# Patient Record
Sex: FEMALE | Race: WHITE | Employment: FULL TIME | URBAN - METROPOLITAN AREA
[De-identification: names, ages, dates, MRNs, and addresses within clinical notes are randomized per-mention and may not be internally consistent; named-entity substitution may affect disease eponyms.]

---

## 2018-03-12 PROCEDURE — 99285 EMERGENCY DEPT VISIT HI MDM: CPT

## 2018-03-12 PROCEDURE — 99285 EMERGENCY DEPT VISIT HI MDM: CPT | Mod: 25

## 2018-03-12 PROCEDURE — 93005 ELECTROCARDIOGRAM TRACING: CPT

## 2018-03-13 ENCOUNTER — APPOINTMENT (OUTPATIENT)
Dept: ULTRASOUND IMAGING | Facility: CLINIC | Age: 28
End: 2018-03-13
Attending: EMERGENCY MEDICINE
Payer: COMMERCIAL

## 2018-03-13 ENCOUNTER — HOSPITAL ENCOUNTER (EMERGENCY)
Facility: CLINIC | Age: 28
Discharge: HOME | End: 2018-03-13
Attending: EMERGENCY MEDICINE | Admitting: EMERGENCY MEDICINE
Payer: COMMERCIAL

## 2018-03-13 ENCOUNTER — APPOINTMENT (OUTPATIENT)
Dept: CT IMAGING | Facility: CLINIC | Age: 28
End: 2018-03-13
Attending: EMERGENCY MEDICINE
Payer: COMMERCIAL

## 2018-03-13 VITALS
RESPIRATION RATE: 18 BRPM | SYSTOLIC BLOOD PRESSURE: 107 MMHG | HEART RATE: 62 BPM | DIASTOLIC BLOOD PRESSURE: 73 MMHG | WEIGHT: 148.15 LBS | TEMPERATURE: 98.3 F | OXYGEN SATURATION: 100 %

## 2018-03-13 DIAGNOSIS — R10.13 ABDOMINAL PAIN, EPIGASTRIC: ICD-10-CM

## 2018-03-13 DIAGNOSIS — R07.9 ACUTE CHEST PAIN: ICD-10-CM

## 2018-03-13 LAB
ALBUMIN SERPL-MCNC: 2.9 G/DL (ref 3.4–5)
ALP SERPL-CCNC: 37 U/L (ref 40–150)
ALT SERPL W P-5'-P-CCNC: 19 U/L (ref 0–50)
ANION GAP SERPL CALCULATED.3IONS-SCNC: 3 MMOL/L (ref 3–14)
AST SERPL W P-5'-P-CCNC: 16 U/L (ref 0–45)
BASOPHILS # BLD AUTO: 0.1 10E9/L (ref 0–0.2)
BASOPHILS NFR BLD AUTO: 0.6 %
BILIRUB SERPL-MCNC: 0.3 MG/DL (ref 0.2–1.3)
BUN SERPL-MCNC: 13 MG/DL (ref 7–30)
CALCIUM SERPL-MCNC: 8.5 MG/DL (ref 8.5–10.1)
CHLORIDE SERPL-SCNC: 103 MMOL/L (ref 94–109)
CO2 SERPL-SCNC: 31 MMOL/L (ref 20–32)
CREAT SERPL-MCNC: 0.69 MG/DL (ref 0.52–1.04)
D DIMER PPP FEU-MCNC: 0.7 UG/ML FEU (ref 0–0.5)
DIFFERENTIAL METHOD BLD: NORMAL
EOSINOPHIL # BLD AUTO: 0.3 10E9/L (ref 0–0.7)
EOSINOPHIL NFR BLD AUTO: 3.6 %
ERYTHROCYTE [DISTWIDTH] IN BLOOD BY AUTOMATED COUNT: 14.1 % (ref 10–15)
GFR SERPL CREATININE-BSD FRML MDRD: >90 ML/MIN/1.7M2
GLUCOSE SERPL-MCNC: 106 MG/DL (ref 70–99)
HCT VFR BLD AUTO: 36.5 % (ref 35–47)
HGB BLD-MCNC: 12.2 G/DL (ref 11.7–15.7)
IMM GRANULOCYTES # BLD: 0 10E9/L (ref 0–0.4)
IMM GRANULOCYTES NFR BLD: 0.2 %
INTERPRETATION ECG - MUSE: NORMAL
LIPASE SERPL-CCNC: 256 U/L (ref 73–393)
LYMPHOCYTES # BLD AUTO: 2.8 10E9/L (ref 0.8–5.3)
LYMPHOCYTES NFR BLD AUTO: 33.7 %
MCH RBC QN AUTO: 28.9 PG (ref 26.5–33)
MCHC RBC AUTO-ENTMCNC: 33.4 G/DL (ref 31.5–36.5)
MCV RBC AUTO: 87 FL (ref 78–100)
MONOCYTES # BLD AUTO: 0.7 10E9/L (ref 0–1.3)
MONOCYTES NFR BLD AUTO: 7.7 %
NEUTROPHILS # BLD AUTO: 4.6 10E9/L (ref 1.6–8.3)
NEUTROPHILS NFR BLD AUTO: 54.2 %
NRBC # BLD AUTO: 0 10*3/UL
NRBC BLD AUTO-RTO: 0 /100
PLATELET # BLD AUTO: 274 10E9/L (ref 150–450)
POTASSIUM SERPL-SCNC: 4.1 MMOL/L (ref 3.4–5.3)
PROT SERPL-MCNC: 6.4 G/DL (ref 6.8–8.8)
RBC # BLD AUTO: 4.22 10E12/L (ref 3.8–5.2)
SODIUM SERPL-SCNC: 137 MMOL/L (ref 133–144)
WBC # BLD AUTO: 8.4 10E9/L (ref 4–11)

## 2018-03-13 PROCEDURE — 25000132 ZZH RX MED GY IP 250 OP 250 PS 637: Performed by: EMERGENCY MEDICINE

## 2018-03-13 PROCEDURE — 85379 FIBRIN DEGRADATION QUANT: CPT | Performed by: EMERGENCY MEDICINE

## 2018-03-13 PROCEDURE — 80053 COMPREHEN METABOLIC PANEL: CPT | Performed by: EMERGENCY MEDICINE

## 2018-03-13 PROCEDURE — 83690 ASSAY OF LIPASE: CPT | Performed by: EMERGENCY MEDICINE

## 2018-03-13 PROCEDURE — 25000125 ZZHC RX 250: Performed by: EMERGENCY MEDICINE

## 2018-03-13 PROCEDURE — 25000128 H RX IP 250 OP 636: Performed by: EMERGENCY MEDICINE

## 2018-03-13 PROCEDURE — 36415 COLL VENOUS BLD VENIPUNCTURE: CPT | Performed by: EMERGENCY MEDICINE

## 2018-03-13 PROCEDURE — 96375 TX/PRO/DX INJ NEW DRUG ADDON: CPT | Mod: 59

## 2018-03-13 PROCEDURE — 76705 ECHO EXAM OF ABDOMEN: CPT

## 2018-03-13 PROCEDURE — 96374 THER/PROPH/DIAG INJ IV PUSH: CPT

## 2018-03-13 PROCEDURE — 71260 CT THORAX DX C+: CPT

## 2018-03-13 PROCEDURE — 85025 COMPLETE CBC W/AUTO DIFF WBC: CPT | Performed by: EMERGENCY MEDICINE

## 2018-03-13 RX ORDER — KETOROLAC TROMETHAMINE 15 MG/ML
15 INJECTION, SOLUTION INTRAMUSCULAR; INTRAVENOUS ONCE
Status: COMPLETED | OUTPATIENT
Start: 2018-03-13 | End: 2018-03-13

## 2018-03-13 RX ORDER — IOPAMIDOL 755 MG/ML
500 INJECTION, SOLUTION INTRAVASCULAR ONCE
Status: COMPLETED | OUTPATIENT
Start: 2018-03-13 | End: 2018-03-13

## 2018-03-13 RX ORDER — MORPHINE SULFATE 4 MG/ML
4 INJECTION, SOLUTION INTRAMUSCULAR; INTRAVENOUS
Status: DISCONTINUED | OUTPATIENT
Start: 2018-03-13 | End: 2018-03-13 | Stop reason: HOSPADM

## 2018-03-13 RX ORDER — MORPHINE SULFATE 4 MG/ML
4 INJECTION, SOLUTION INTRAMUSCULAR; INTRAVENOUS ONCE
Status: DISCONTINUED | OUTPATIENT
Start: 2018-03-13 | End: 2018-03-13

## 2018-03-13 RX ADMIN — KETOROLAC TROMETHAMINE 15 MG: 15 INJECTION, SOLUTION INTRAMUSCULAR; INTRAVENOUS at 02:39

## 2018-03-13 RX ADMIN — LIDOCAINE HYDROCHLORIDE 30 ML: 20 SOLUTION ORAL; TOPICAL at 00:43

## 2018-03-13 RX ADMIN — IOPAMIDOL 59 ML: 755 INJECTION, SOLUTION INTRAVENOUS at 02:17

## 2018-03-13 RX ADMIN — MORPHINE SULFATE 4 MG: 4 INJECTION INTRAVENOUS at 01:44

## 2018-03-13 RX ADMIN — SODIUM CHLORIDE 79 ML: 9 INJECTION, SOLUTION INTRAVENOUS at 02:17

## 2018-03-13 ASSESSMENT — ENCOUNTER SYMPTOMS
DIAPHORESIS: 0
CHILLS: 0
VOMITING: 0
ABDOMINAL PAIN: 0
FEVER: 0
BACK PAIN: 1
NAUSEA: 0
SHORTNESS OF BREATH: 1
HEADACHES: 1

## 2018-03-13 NOTE — ED AVS SNAPSHOT
[facility] Emergency Department    [address]    [facility] [address]    Phone:  [phone]    Fax:  [phone]                                       Wendi Cerda   MRN: [id number]    Department:  [facility] Emergency Department   Date of Visit:  3/12/2018           Patient Information     Date Of Birth          1990        Your diagnoses for this visit were:     Acute chest pain     Abdominal pain, epigastric        You were seen by Sloane Vinson MD.      Follow-up Information     Follow up with Clinic, [facility] In 2 days.    Why:  for recheck     Contact information:    [id number] [facility] [id number] [phone]          Follow up with [facility] Emergency Department.    Specialty:  EMERGENCY MEDICINE    Why:  If symptoms worsen    Contact information:    [address]  [facility] [address]  [phone]        Discharge Instructions       Discharge Instructions  Chest Pain    You have been seen today for chest pain or discomfort.  At this time, your provider has found no signs that your chest pain is due to a serious or life-threatening condition, (or you have declined more testing and/or admission to the hospital). However, sometimes there is a serious problem that does not show up right away. Your evaluation today may not be complete and you may need further testing and evaluation.     Generally, every Emergency Department visit should have a follow-up clinic visit with either a primary or a specialty clinic/provider. Please follow-up as instructed by your emergency provider today.  Return to the Emergency Department if:    Your chest pain changes, gets worse, starts to happen more often, or comes with less activity.    You are newly short of breath.    You get very weak or tired.    You pass out or faint.    You have any new symptoms, like fever, cough, numb legs, or you cough up  blood.    You have anything else that worries you.    Until you follow-up with your regular provider, please do the following:    Take one aspirin daily unless you have an allergy or are told not to by your provider.    If a stress test appointment has been made, go to the appointment.    If you have questions, contact your regular provider.    Follow-up with your regular provider/clinic as directed; this is very important.    If you were given a prescription for medicine here today, be sure to read all of the information (including the package insert) that comes with your prescription.  This will include important information about the medicine, its side effects, and any warnings that you need to know about.  The pharmacist who fills the prescription can provide more information and answer questions you may have about the medicine.  If you have questions or concerns that the pharmacist cannot address, please call or return to the Emergency Department.       Remember that you can always come back to the Emergency Department if you are not able to see your regular provider in the amount of time listed above, if you get any new symptoms, or if there is anything that worries you.    Discharge Instructions  Abdominal Pain    Abdominal pain (belly pain) can be caused by many things. Your evaluation today does not show the exact cause for your pain. Your provider today has decided that it is unlikely your pain is due to a life threatening problem, or a problem requiring surgery or hospital admission. Sometimes those problems cannot be found right away, so it is very important that you follow up as directed.  Sometimes only the changes which occur over time allow the cause of your pain to be found.    Generally, every Emergency Department visit should have a follow-up clinic visit with either a primary or a specialty clinic/provider. Please follow-up as instructed by your emergency provider today. With abdominal pain, we  often recommend very close follow-up, such as the following day.    ADULTS:  Return to the Emergency Department right away if:      You get an oral temperature above 102oF or as directed by your provider.    You have blood in your stools. This may be bright red or appear as black, tarry stools.      You keep vomiting (throwing up) or cannot drink liquids.    You see blood when you vomit.     You cannot have a bowel movement or you cannot pass gas.    Your stomach gets bloated or bigger.    Your skin or the whites of your eyes look yellow.    You faint.    You have bloody, frequent or painful urination (peeing).    You have new symptoms or anything that worries you.    CHILDREN:  Return to the Emergency Department right away if your child has any of the above-listed symptoms or the following:      Pushes your hand away or screams/cries when his/her belly is touched.    You notice your child is very fussy or weak.    Your child is very tired and is too tired to eat or drink.    Your child is dehydrated.  Signs of dehydration can be:  o Significant change in the amount of wet diapers/urine.  o Your infant or child starts to have dry mouth and lips, or no saliva (spit) or tears.    PREGNANT WOMEN:  Return to the Emergency Department right away if you have any of the above-listed symptoms or the following:      You have bleeding, leaking fluid or passing tissue from the vagina.    You have worse pain or cramping, or pain in your shoulder or back.    You have vomiting that will not stop.    You have a temperature of 100oF or more.    Your baby is not moving as much as usual.    You faint.    You get a bad headache with or without eye problems and abdominal pain.    You have a seizure.    You have unusual discharge from your vagina and abdominal pain.    Abdominal pain is pretty common during pregnancy.  Your pain may or may not be related to your pregnancy. You should follow-up closely with your OB provider so they can  "evaluate you and your baby.  Until you follow-up with your regular provider, do the following:       Avoid sex and do not put anything in your vagina.    Drink clear fluids.    Only take medications approved by your provider.    MORE INFORMATION:    Appendicitis:  A possible cause of abdominal pain in any person who still has their appendix is acute appendicitis. Appendicitis is often hard to diagnose.  Testing does not always rule out early appendicitis or other causes of abdominal pain. Close follow-up with your provider and re-evaluations may be needed to figure out the reason for your abdominal pain.    Follow-up:  It is very important that you make an appointment with your clinic and go to the appointment.  If you do not follow-up with your primary provider, it may result in missing an important development which could result in permanent injury or disability and/or lasting pain.  If there is any problem keeping your appointment, call your provider or return to the Emergency Department.    Medications:  Take your medications as directed by your provider today.  Before using over-the-counter medications, ask your provider and make sure to take the medications as directed.  If you have any questions about medications, ask your provider.    Diet:  Resume your normal diet as much as possible, but do not eat fried, fatty or spicy foods while you have pain.  Do not drink alcohol or have caffeine.  Do not smoke tobacco.    Probiotics: If you have been given an antibiotic, you may want to also take a probiotic pill or eat yogurt with live cultures. Probiotics have \"good bacteria\" to help your intestines stay healthy. Studies have shown that probiotics help prevent diarrhea (loose stools) and other intestine problems (including C. diff infection) when you take antibiotics. You can buy these without a prescription in the pharmacy section of the store.     If you were given a prescription for medicine here today, be sure " to read all of the information (including the package insert) that comes with your prescription.  This will include important information about the medicine, its side effects, and any warnings that you need to know about.  The pharmacist who fills the prescription can provide more information and answer questions you may have about the medicine.  If you have questions or concerns that the pharmacist cannot address, please call or return to the Emergency Department.       Remember that you can always come back to the Emergency Department if you are not able to see your regular provider in the amount of time listed above, if you get any new symptoms, or if there is anything that worries you.      24 Hour Appointment Hotline       To make an appointment at any [facility], call [phone] ([phone]). If you don't have a family doctor or clinic, we will help you find one. [facility] are conveniently located to serve the needs of you and your family.             Review of your medicines      Our records show that you are taking the medicines listed below. If these are incorrect, please call your family doctor or clinic.        Dose / Directions Last dose taken    acetaminophen 325 MG tablet   Commonly known as:  TYLENOL   Dose:  650 mg   Quantity:  40 tablet        Take 2 tablets (650 mg) by mouth every 4 hours as needed   Refills:  0        ADDERALL PO        Take  by mouth.   Refills:  0        ibuprofen 200 MG tablet   Commonly known as:  ADVIL/MOTRIN   Dose:  600 mg   Quantity:  40 tablet        Take 3 tablets (600 mg) by mouth every 6 hours as needed   Refills:  0        LEVOTHYROXINE SODIUM PO        Refills:  0        magic mouthwash suspension (diphenhydrAMINE, lidocaine, aluminum-magnesium & simethicone) Susp compounding kit   Dose:  5-10 mL   Quantity:  237 mL        Swish and swallow 5-10 mLs in mouth every 6 hours as needed   Refills:  1        norethindrone-ethinyl estradiol-iron  1-20/1-30/1-35 MG-MCG per tablet   Commonly known as:  ESTROSTEP FE   Dose:  1 tablet        Take 1 tablet by mouth daily   Refills:  0                Procedures and tests performed during your visit     CBC with platelets differential    CT Chest Pulmonary Embolism w Contrast    Comprehensive metabolic panel    D dimer quantitative    EKG 12 lead    Lipase    US Abdomen Limited      Orders Needing Specimen Collection     None      Pending Results     Date and Time Order Name Status Description    3/12/2018 2358 EKG 12 lead Preliminary             Pending Culture Results     No orders found from 3/11/2018 to 3/14/2018.            Pending Results Instructions     If you had any lab results that were not finalized at the time of your Discharge, you can call the ED Lab Result RN at [phone]. You will be contacted by this team for any positive Lab results or changes in treatment. The nurses are available 7 days a week from 10A to 6:30P.  You can leave a message 24 hours per day and they will return your call.        Test Results From Your Hospital Stay        3/13/2018  1:01 AM      Component Results     Component Value Ref Range & Units Status    WBC 8.4 4.0 - 11.0 10e9/L Final    RBC Count 4.22 3.8 - 5.2 10e12/L Final    Hemoglobin 12.2 11.7 - 15.7 g/dL Final    Hematocrit 36.5 35.0 - 47.0 % Final    MCV 87 78 - 100 fl Final    MCH 28.9 26.5 - 33.0 pg Final    MCHC 33.4 31.5 - 36.5 g/dL Final    RDW 14.1 10.0 - 15.0 % Final    Platelet Count 274 150 - 450 10e9/L Final    Diff Method Automated Method  Final    % Neutrophils 54.2 % Final    % Lymphocytes 33.7 % Final    % Monocytes 7.7 % Final    % Eosinophils 3.6 % Final    % Basophils 0.6 % Final    % Immature Granulocytes 0.2 % Final    Nucleated RBCs 0 0 /100 Final    Absolute Neutrophil 4.6 1.6 - 8.3 10e9/L Final    Absolute Lymphocytes 2.8 0.8 - 5.3 10e9/L Final    Absolute Monocytes 0.7 0.0 - 1.3 10e9/L Final    Absolute Eosinophils 0.3 0.0 - 0.7 10e9/L Final     Absolute Basophils 0.1 0.0 - 0.2 10e9/L Final    Abs Immature Granulocytes 0.0 0 - 0.4 10e9/L Final    Absolute Nucleated RBC 0.0  Final         3/13/2018  1:16 AM      Component Results     Component Value Ref Range & Units Status    Sodium 137 133 - 144 mmol/L Final    Potassium 4.1 3.4 - 5.3 mmol/L Final    Chloride 103 94 - 109 mmol/L Final    Carbon Dioxide 31 20 - 32 mmol/L Final    Anion Gap 3 3 - 14 mmol/L Final    Glucose 106 (H) 70 - 99 mg/dL Final    Urea Nitrogen 13 7 - 30 mg/dL Final    Creatinine 0.69 0.52 - 1.04 mg/dL Final    GFR Estimate >90 >60 mL/min/1.7m2 Final    Non  GFR Calc    GFR Estimate If Black >90 >60 mL/min/1.7m2 Final    African American GFR Calc    Calcium 8.5 8.5 - 10.1 mg/dL Final    Bilirubin Total 0.3 0.2 - 1.3 mg/dL Final    Albumin 2.9 (L) 3.4 - 5.0 g/dL Final    Protein Total 6.4 (L) 6.8 - 8.8 g/dL Final    Alkaline Phosphatase 37 (L) 40 - 150 U/L Final    ALT 19 0 - 50 U/L Final    AST 16 0 - 45 U/L Final         3/13/2018  1:12 AM      Component Results     Component Value Ref Range & Units Status    D Dimer 0.7 (H) 0.0 - 0.50 ug/ml FEU Final    This D-dimer assay is intended for use in conjunction with a clinical pretest   probability assessment model to exclude pulmonary embolism (PE) and deep   venous thrombosis (DVT) in outpatients suspected of PE or DVT. The cut-off   value is 0.5 ug/mL FEU.           3/13/2018  1:16 AM      Component Results     Component Value Ref Range & Units Status    Lipase 256 73 - 393 U/L Final         3/13/2018  2:48 AM      Narrative     CT CHEST PULMONARY EMBOLISM W CONTRAST  3/13/2018 2:19 AM     HISTORY: Chest pain, shortness of breath, D-dimer elevated, evaluate  for pulmonary embolus.    TECHNIQUE: Volumetric acquisition of the chest after the  administration of 59 mL Isovue-370 IV contrast. Radiation dose for  this scan was reduced using automated exposure control, adjustment of  the mA and/or kV according to patient  size, or iterative  reconstruction technique.     COMPARISON: None.    FINDINGS: No visualized pulmonary embolism. No acute infiltrates,  pleural effusions or pneumothorax. Normal heart size. No enlarged  mediastinal or hilar lymph nodes. No acute findings in the visualized  upper abdomen.        Impression     IMPRESSION: No visualized pulmonary embolism or other acute findings.    MAGAN JANSEN MD         3/13/2018  3:59 AM      Narrative     US ABDOMEN LIMITED  3/13/2018 3:45 AM    CLINICAL INFORMATION: Right upper quadrant pain.    COMPARISON: None.    FINDINGS: Limited right upper quadrant ultrasound demonstrates a  negative gallbladder. No gallstones or gallbladder wall thickening. No  bile duct dilatation. The common hepatic duct measures 0.4 cm in  diameter. Negative liver. Visualized portions of the pancreas are  negative. The visualized portion of the right kidney is unremarkable.  No hydronephrosis on the right.        Impression     IMPRESSION: No acute sonographic findings in the right upper quadrant.    MAGAN JANSEN MD                Clinical Quality Measure: Blood Pressure Screening     Your blood pressure was checked while you were in the emergency department today. The last reading we obtained was  BP: 101/58 . Please read the guidelines below about what these numbers mean and what you should do about them.  If your systolic blood pressure (the top number) is less than 120 and your diastolic blood pressure (the bottom number) is less than 80, then your blood pressure is normal. There is nothing more that you need to do about it.  If your systolic blood pressure (the top number) is 120-139 or your diastolic blood pressure (the bottom number) is 80-89, your blood pressure may be higher than it should be. You should have your blood pressure rechecked within a year by a primary care provider.  If your systolic blood pressure (the top number) is 140 or greater or your diastolic blood pressure  "(the bottom number) is 90 or greater, you may have high blood pressure. High blood pressure is treatable, but if left untreated over time it can put you at risk for heart attack, stroke, or kidney failure. You should have your blood pressure rechecked by a primary care provider within the next 4 weeks.  If your provider in the emergency department today gave you specific instructions to follow-up with your doctor or provider even sooner than that, you should follow that instruction and not wait for up to 4 weeks for your follow-up visit.        Thank you for choosing [facility]       Thank you for choosing [facility] for your care. Our goal is always to provide you with excellent care. Hearing back from our patients is one way we can continue to improve our services. Please take a few minutes to complete the written survey that you may receive in the mail after you visit with us. Thank you!        [employer] Information     Blip lets you send messages to your doctor, view your test results, renew your prescriptions, schedule appointments and more. To sign up, go to [email or web address] . Click on \"Log in\" on the left side of the screen, which will take you to the Welcome page. Then click on \"Sign up Now\" on the right side of the page.     You will be asked to enter the access code listed below, as well as some personal information. Please follow the directions to create your username and password.     Your access code is: [email or web address]  Expires: 2018  4:04 AM     Your access code will  in 90 days. If you need help or a new code, please call your [facility] clinic or [phone].        Care EveryWhere ID     This is your Care EveryWhere ID. This could be used by other organizations to access your [facility] medical records  [id number]        Equal Access to Services     TITO HERNANDEZ : Jt Carreon, taqueria ochoa, albaro baer. So keo " [phone].    ATENCIÓN: Si habla español, tiene a aguilar disposición servicios gratuitos de asistencia lingüística. Llame al [phone].    We comply with applicable federal civil rights laws and [place] laws. We do not discriminate on the basis of race, color, national origin, age, disability, sex, sexual orientation, or gender identity.            After Visit Summary       This is your record. Keep this with you and show to your community pharmacist(s) and doctor(s) at your next visit.

## 2018-03-13 NOTE — DISCHARGE INSTRUCTIONS
Discharge Instructions  Chest Pain    You have been seen today for chest pain or discomfort.  At this time, your provider has found no signs that your chest pain is due to a serious or life-threatening condition, (or you have declined more testing and/or admission to the hospital). However, sometimes there is a serious problem that does not show up right away. Your evaluation today may not be complete and you may need further testing and evaluation.     Generally, every Emergency Department visit should have a follow-up clinic visit with either a primary or a specialty clinic/provider. Please follow-up as instructed by your emergency provider today.  Return to the Emergency Department if:    Your chest pain changes, gets worse, starts to happen more often, or comes with less activity.    You are newly short of breath.    You get very weak or tired.    You pass out or faint.    You have any new symptoms, like fever, cough, numb legs, or you cough up blood.    You have anything else that worries you.    Until you follow-up with your regular provider, please do the following:    Take one aspirin daily unless you have an allergy or are told not to by your provider.    If a stress test appointment has been made, go to the appointment.    If you have questions, contact your regular provider.    Follow-up with your regular provider/clinic as directed; this is very important.    If you were given a prescription for medicine here today, be sure to read all of the information (including the package insert) that comes with your prescription.  This will include important information about the medicine, its side effects, and any warnings that you need to know about.  The pharmacist who fills the prescription can provide more information and answer questions you may have about the medicine.  If you have questions or concerns that the pharmacist cannot address, please call or return to the Emergency Department.       Remember that  you can always come back to the Emergency Department if you are not able to see your regular provider in the amount of time listed above, if you get any new symptoms, or if there is anything that worries you.    Discharge Instructions  Abdominal Pain    Abdominal pain (belly pain) can be caused by many things. Your evaluation today does not show the exact cause for your pain. Your provider today has decided that it is unlikely your pain is due to a life threatening problem, or a problem requiring surgery or hospital admission. Sometimes those problems cannot be found right away, so it is very important that you follow up as directed.  Sometimes only the changes which occur over time allow the cause of your pain to be found.    Generally, every Emergency Department visit should have a follow-up clinic visit with either a primary or a specialty clinic/provider. Please follow-up as instructed by your emergency provider today. With abdominal pain, we often recommend very close follow-up, such as the following day.    ADULTS:  Return to the Emergency Department right away if:      You get an oral temperature above 102oF or as directed by your provider.    You have blood in your stools. This may be bright red or appear as black, tarry stools.      You keep vomiting (throwing up) or cannot drink liquids.    You see blood when you vomit.     You cannot have a bowel movement or you cannot pass gas.    Your stomach gets bloated or bigger.    Your skin or the whites of your eyes look yellow.    You faint.    You have bloody, frequent or painful urination (peeing).    You have new symptoms or anything that worries you.    CHILDREN:  Return to the Emergency Department right away if your child has any of the above-listed symptoms or the following:      Pushes your hand away or screams/cries when his/her belly is touched.    You notice your child is very fussy or weak.    Your child is very tired and is too tired to eat or  drink.    Your child is dehydrated.  Signs of dehydration can be:  o Significant change in the amount of wet diapers/urine.  o Your infant or child starts to have dry mouth and lips, or no saliva (spit) or tears.    PREGNANT WOMEN:  Return to the Emergency Department right away if you have any of the above-listed symptoms or the following:      You have bleeding, leaking fluid or passing tissue from the vagina.    You have worse pain or cramping, or pain in your shoulder or back.    You have vomiting that will not stop.    You have a temperature of 100oF or more.    Your baby is not moving as much as usual.    You faint.    You get a bad headache with or without eye problems and abdominal pain.    You have a seizure.    You have unusual discharge from your vagina and abdominal pain.    Abdominal pain is pretty common during pregnancy.  Your pain may or may not be related to your pregnancy. You should follow-up closely with your OB provider so they can evaluate you and your baby.  Until you follow-up with your regular provider, do the following:       Avoid sex and do not put anything in your vagina.    Drink clear fluids.    Only take medications approved by your provider.    MORE INFORMATION:    Appendicitis:  A possible cause of abdominal pain in any person who still has their appendix is acute appendicitis. Appendicitis is often hard to diagnose.  Testing does not always rule out early appendicitis or other causes of abdominal pain. Close follow-up with your provider and re-evaluations may be needed to figure out the reason for your abdominal pain.    Follow-up:  It is very important that you make an appointment with your clinic and go to the appointment.  If you do not follow-up with your primary provider, it may result in missing an important development which could result in permanent injury or disability and/or lasting pain.  If there is any problem keeping your appointment, call your provider or return to  "the Emergency Department.    Medications:  Take your medications as directed by your provider today.  Before using over-the-counter medications, ask your provider and make sure to take the medications as directed.  If you have any questions about medications, ask your provider.    Diet:  Resume your normal diet as much as possible, but do not eat fried, fatty or spicy foods while you have pain.  Do not drink alcohol or have caffeine.  Do not smoke tobacco.    Probiotics: If you have been given an antibiotic, you may want to also take a probiotic pill or eat yogurt with live cultures. Probiotics have \"good bacteria\" to help your intestines stay healthy. Studies have shown that probiotics help prevent diarrhea (loose stools) and other intestine problems (including C. diff infection) when you take antibiotics. You can buy these without a prescription in the pharmacy section of the store.     If you were given a prescription for medicine here today, be sure to read all of the information (including the package insert) that comes with your prescription.  This will include important information about the medicine, its side effects, and any warnings that you need to know about.  The pharmacist who fills the prescription can provide more information and answer questions you may have about the medicine.  If you have questions or concerns that the pharmacist cannot address, please call or return to the Emergency Department.       Remember that you can always come back to the Emergency Department if you are not able to see your regular provider in the amount of time listed above, if you get any new symptoms, or if there is anything that worries you.    "

## 2018-03-13 NOTE — ED PROVIDER NOTES
"  History     Chief Complaint:  Chest pain     HPI   Wendi Cerda is a 28-year-old female who presents for evaluation of 6 hours of chest pain, back pain, and shortness of breath.  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  She states that it has gotten progressively worse.  She now states that the pain feels \"like someone is giving me a bear hug.\"  She has associated pleuritic pain and states that the pain is worse with movement.  It is located in her right chest, radiates across her chest, and also pain in her upper abdomen and thoracic back. Denies any nausea, vomiting, but does endorse some minor upper abdominal pain.  She has had no fever.  Denies any cough.  No history of abdominal surgery.  Patient is on birth control.        PE/DVT Risk Factors     Personal History:   Negative  Recent Travel:   Negative   Recent Surg/Hospitalization:  Negative  Tobacco:    Negative (former smoker)  Family History:   Negative  Hormone Use:   POSITIVE   Cancer:    Negative  Trauma:    Negative       Allergies:  No known drug allergies.      Medications:    Levothyroxine   Estrostep   Adderall      Past Medical History:    Back pain   GERD   Hypothyroidism   Scoliosis     Past Surgical History:    History reviewed. No pertinent past surgical history.     Family History:    History reviewed. No pertinent family history.     Social History:  Marital Status: Single   Presents to the ED with friend   Tobacco Use: Former smoker   Alcohol Use: Yes   PCP: Lisa Chavez      Review of Systems   Constitutional: Negative for chills, diaphoresis and fever.   Respiratory: Positive for shortness of breath.    Cardiovascular: Positive for chest pain. Negative for leg swelling (or pain).   Gastrointestinal: Negative for abdominal pain, nausea and vomiting.   Musculoskeletal: Positive for back pain.   Neurological: Positive for headaches.   All other systems reviewed and " are negative.    Physical Exam   First Vitals:  BP: 124/84  Pulse: 68  Temp: 98.3  F (36.8  C)  Resp: 18  Weight: 67.2 kg (148 lb 2.4 oz)  SpO2: 100 %    Physical Exam  Constitutional: Alert, attentive, GCS 15, young woman in no acute distress, sitting up in bed   HENT:    Nose: Nose normal.    Mouth/Throat: Oropharynx is clear, mucous membranes are moist   Eyes: Normal conjunctiva. Pupils are equal, round, and reactive to light.   CV: regular rate and rhythm; no murmurs, rubs or gallups  Chest: Effort normal and breath sounds normal. No wheezing, rhonchi, or rales.   GI:  There is tenderness to palpation in upper abdomen with guarding, no rebound.  +Sipsey sign. No distension. Normal bowel sounds. No CVA tenderness to percussion.    MSK: Normal range of motion, no calf tenderness to palpation.   Neurological: Alert, attentive, oriented x4, strength normal  Skin: Skin is warm and dry.    Emergency Department Course   ECG:  @ 2344  Indication: chest pain and shortness of breath   Vent. Rate 62 bpm. OH interval 114 ms. QRS duration 72 ms. QT/QTc 420/426 ms. P-R-T axis 42 58 43.   Normal sinus rhythm. Vormal ECG.  No significant change when compared to previous ECG from 1/17/15   Read @ 2345 by Dr. Vinson.     Imaging:  CT Chest PE w Contrast  No visualized pulmonary embolism or other acute findings.  Report per radiology: Shakir Dominguez MD (03/13/18 02:47:24)    US Abdomen Limited  No acute sonographic findings in the right upper quadrant.  Report per radiology: Shakir Dominguez MD (03/13/18 03:58:53)    Radiographic findings were communicated with the patient who voiced understanding of the findings.    Laboratory:  Blood:  CBC:  WBC 8.4, HGB 12.2, , otherwise WNL  CMP: Albumin 2.9, Total protein 6.4, Alkphos 37, otherwise WNL (Creatinine 0.69)   Lipase: 256     D Dimer quantitative: 0.7 (H)     Interventions:  (0043) GI Cocktail, 30 mL, PO   (0144) Morphine, 4 mg, IV injection  (0239) Toradol,  15 mg, IV injection     Emergency Department Course:  EKG was done, interpretation as above.     Nursing notes and vitals reviewed.    (0028) I entered the room with my scribe, obtained the history, and performed an exam of the patient as documented above.    A peripheral IV was established. Blood was drawn from the patient. This was sent for laboratory testing, findings above.      After the patient's D-dimer returned elevated, the patient was sent for a CT of the chest to rule out pulmonary embolism.     (0233) I went to check in on the patient and update her on the findings. The patient is still having pain and right upper quadrant tenderness.       (0410) I personally reviewed the laboratory and imaging results with the patient and answered all related questions prior to discharge.      Findings and plan explained to the patient. Patient discharged home with instructions regarding supportive care, medications, and reasons to return. The importance of close follow-up was reviewed.     Impression & Plan    Medical Decision Makin-year-old female presenting with right-sided chest pain, upper abdominal pain, and back pain.  A broad differential diagnosis was considered including pulmonary embolism, gastritis, peptic ulcer disease, cholelithiasis, cholecystitis, musculoskeletal strain, costochondritis, pneumonia, pancreatitis.  Abdominal labs are reassuring with normal lipase and liver function tests.  She does not have a leukocytosis report fevers.  She was PERC positive given OCP use and d-dimer elevated, therefore CT chest was obtained and does not show any evidence of pulmonary embolism.  She continued to have pain in her right upper quadrant and epigastrium after pain medications and repeat exam.  Right upper quadrant ultrasound does not show any evidence of cholelithiasis or cholecystitis.  I rechecked the patient and she is feeling moderately improved.  I discussed the results with the patient and she  felt comfortable with discharge home.  I discussed that the exact etiology of her pain is unknown at this time.  She will try trial of ibuprofen if pain returns and will need close follow-up with primary care physician.  All questions were answered.    Diagnosis:    ICD-10-CM   1. Acute chest pain R07.9   2. Abdominal pain, epigastric R10.13     Disposition:  discharged to home          [facility] EMERGENCY DEPARTMENT      Scribe disclosure:  I, Walt Colón, am serving as a scribe on 3/13/2018 at 12:26 AM to personally document services performed by Dr. Vinson based on my observations and the provider's statements to me.                Sloane Vinson MD  03/13/18 [id number]

## 2018-03-13 NOTE — ED AVS SNAPSHOT
[facility] Emergency Department    [address]    [facility] [address]    Phone:  [phone]    Fax:  [phone]                                       Wendi Cerda   MRN: [id number]    Department:  [facility] Emergency Department   Date of Visit:  3/12/2018           After Visit Summary Signature Page     I have received my discharge instructions, and my questions have been answered. I have discussed any challenges I see with this plan with the nurse or doctor.    ..........................................................................................................................................  Patient/Patient Representative Signature      ..........................................................................................................................................  Patient Representative Print Name and Relationship to Patient    ..................................................               ................................................  Date                                            Time    ..........................................................................................................................................  Reviewed by Signature/Title    ...................................................              ..............................................  Date                                                            Time

## 2021-05-28 ENCOUNTER — RECORDS - HEALTHEAST (OUTPATIENT)
Dept: ADMINISTRATIVE | Facility: CLINIC | Age: 31
End: 2021-05-28

## 2021-05-29 ENCOUNTER — RECORDS - HEALTHEAST (OUTPATIENT)
Dept: ADMINISTRATIVE | Facility: CLINIC | Age: 31
End: 2021-05-29

## 2022-02-23 ENCOUNTER — TRANSFERRED RECORDS (OUTPATIENT)
Dept: HEALTH INFORMATION MANAGEMENT | Facility: CLINIC | Age: 32
End: 2022-02-23
Payer: COMMERCIAL

## 2022-02-23 LAB
TSH SERPL-ACNC: 13.2 UIU/ML (ref 0.27–4.2)
TSH SERPL-ACNC: 13.2 UIU/ML (ref 0.27–4.2)

## 2022-02-24 ENCOUNTER — MEDICAL CORRESPONDENCE (OUTPATIENT)
Dept: HEALTH INFORMATION MANAGEMENT | Facility: CLINIC | Age: 32
End: 2022-02-24
Payer: COMMERCIAL

## 2022-02-24 ENCOUNTER — TRANSFERRED RECORDS (OUTPATIENT)
Dept: HEALTH INFORMATION MANAGEMENT | Facility: CLINIC | Age: 32
End: 2022-02-24
Payer: COMMERCIAL

## 2022-06-21 ENCOUNTER — VIRTUAL VISIT (OUTPATIENT)
Dept: ENDOCRINOLOGY | Facility: CLINIC | Age: 32
End: 2022-06-21
Payer: COMMERCIAL

## 2022-06-21 DIAGNOSIS — E03.9 HYPOTHYROIDISM, UNSPECIFIED TYPE: Primary | ICD-10-CM

## 2022-06-21 PROCEDURE — 99204 OFFICE O/P NEW MOD 45 MIN: CPT | Mod: 95 | Performed by: INTERNAL MEDICINE

## 2022-06-21 RX ORDER — THYROID 120 MG/1
TABLET ORAL
COMMUNITY
Start: 2022-06-21

## 2022-06-21 RX ORDER — THYROID 120 MG/1
TABLET ORAL
COMMUNITY
Start: 2021-11-12 | End: 2022-06-21

## 2022-06-21 NOTE — PATIENT INSTRUCTIONS
-[facility]  Dr Regalado, Endocrinology Department    [place] [address] [id number]  [place] [address]  Appointment [phone]  Fax: [phone]  [place]: Monday - Thursday      Labs needed.  Dose adjustment based on that.  Continue NP thyroid at current dose for now.

## 2022-06-21 NOTE — PROGRESS NOTES
Wendi is a 32 year old who is being evaluated via a billable video visit.      How would you like to obtain your AVS? Mail a copy  If the video visit is dropped, the invitation should be resent by: Text to cell phone: [phone]  Will anyone else be joining your video visit? No

## 2022-06-21 NOTE — LETTER
"    6/21/2022         RE: Wendi Cerda  [id number]  [facility] [address]        Dear Colleague,    Thank you for referring your patient, Wendi Cerda, to the [facility]. Please see a copy of my visit note below.    Wendi is a 32 year old who is being evaluated via a billable video visit.      How would you like to obtain your AVS? Mail a copy  If the video visit is dropped, the invitation should be resent by: Text to cell phone: [phone]  Will anyone else be joining your video visit? No          THIS IS A VIDEO VISIT:    Phone call visit/virtual visit encounter:    Name of patient: Wendi Cerda    Date of encounter: 6/21/2022    Time of start of video visit: 3;30    Video started:3:40    Video ended:3:54    Provider location: working from home/ [facility]    Patient location: patients home.    Mode of transmission: video/ Doximity    Verbal consent: obtained before starting visit. Pt is agreeable.      The patient has been notified of following:      \"This VIDEO visit will be conducted via a call between you and your physician/provider. We have found that certain health care needs can be provided without the need for a physical exam.  This service lets us provide the care you need with a short phone conversation.  If a prescription is necessary we can send it directly to your pharmacy.  If lab work is needed we can place an order for that and you can then stop by our lab to have the test done at a later time.     With new updates with corona virus patient might be billed as clinic visit.     If during the course of the call the physician/provider feels a telephone visit is not appropriate, you will not be charged for this service.\"      Past medical history, social history, family history, allergy and medications were reviewed and updated as appropriate.  Reviewed pertinent labs, notes, imaging studies personally.    ENDOCRINOLOGY CLINIC NOTE:    Name: Wendi LEAL" Prashant  Seen in consultation with Luna CAICEDO ([facility]) for Hypothyroidism.  HPI:  Wendi Cerda is a 32 year old female who presents for the evaluation of above.   has a past medical history of Back pain, Gastro-oesophageal reflux disease, and Scoliosis.   PCP is outside [place].  Available records, labs and images from outside clinic were personally reviewed.    #1 Hypothyroidism.  Initially diagnosed in hers early 20s.  She was started on levothyroxine but was not able to tolerate that. (she had acne on it which resolved after switching to [place])    Was switched to [place] X 7 years    Currently taking NP thyroid 120 mg/day.  On this dose X 3/2022.    In last year she has gained weight and not able to loose it.  Feeling tired all the time.    Palpitations: Sometimes  Changes to hair or skin: + hair loss  Diarrhea/Constipation:sometimes constipation  Changes in menses: on OC pills. Has 2 kids. No plans for future pregnancy at this time.  Changes in vision:sometiems blurry  Dysphagia or Shortness of breath:No  Tremors: sometimes  Difficulty sleeping: hard to go to sleep and stay asleep. Has unremarkable sleep study done in past by pt report.  Changes in weight: as noted above  Heat or cold intolerance: + cold intolerance.  History of Lithium or Amiodarone use:No  Head or neck surgery/radiation:No  Family History of Thyroid Problems: No  PMH/PSH:  Past Medical History:   Diagnosis Date     Back pain      Gastro-oesophageal reflux disease      Scoliosis      Past Surgical History:   Procedure Laterality Date     TONSILLECTOMY       Family Hx:  Thyroid disease: No           Social Hx:  Social History     Socioeconomic History     Marital status: Single     Spouse name: Not on file     Number of children: Not on file     Years of education: Not on file     Highest education level: Not on file   Occupational History     Not on file   Tobacco Use     Smoking status: Former Smoker     Smokeless tobacco:  Never Used   Substance and Sexual Activity     Alcohol use: Yes     Drug use: No     Comment: e cig     Sexual activity: Not on file   Other Topics Concern     Not on file   Social History Narrative     Not on file     Social Determinants of Health     Financial Resource Strain: Not on file   Food Insecurity: Not on file   Transportation Needs: Not on file   Physical Activity: Not on file   Stress: Not on file   Social Connections: Not on file   Intimate Partner Violence: Not on file   Housing Stability: Not on file          MEDICATIONS:  has a current medication list which includes the following prescription(s): acetaminophen, amphetamine-dextroamphetamine, ibuprofen, norethindrone-ethinyl estradiol-iron, thyroid, magic mouthwash suspension (diphenhydramine, lidocaine, aluminum-magnesium & simethicone), and levothyroxine sodium.    ROS   ROS: 10 point ROS neg other than the symptoms noted above in the HPI.    Physical Exam   VS: There were no vitals taken for this visit.  GENERAL: healthy, alert and no distress  EYES: Eyes grossly normal to inspection, conjunctivae and sclerae normal  ENT: no nose swelling, nasal discharge.  Thyroid: no apparent thyroid nodules  RESP: no audible wheeze, cough, or visible cyanosis.  No visible retractions or increased work of breathing.  Able to speak fully in complete sentences.  ABDO: not evaluated.  EXTREMITIES: no hand tremors.  NEURO: Cranial nerves grossly intact, mentation intact and speech normal  SKIN: No apparent skin lesions, rash or edema seen   PSYCH: mentation appears normal, affect normal/bright, judgement and insight intact, normal speech and appearance well-groomed    LABS:  TFTs:  ENDO THYROID LABS-[facility] Latest Ref Rng & Units 2/23/2022   TSH (EXTERNAL) 0.270 - 4.200 uIU/ml 13.200 (A)     TG/TPO:    All pertinent notes, labs, and images personally reviewed by me.     A/P  Ms.Jessica ELVIS Cerda is a 32 year old here for the evaluation of hypothyroidism:    #1  Hypothyroidism. Differential includes: autoimmune disease (Hashimoto's thyroiditis), treatment for hyperthyroidism, radiation therapy, thyroid surgery, medications, congenital disease, pituitary disorder, pregnancy, and iodine deficiency.  Persons with Hashimoto's thyroiditis have serum antibodies reacting with TG, TPO, and against an unidentified protein present in colloid.   Currently taking NP thyroid 120 mg/day and on this dose since 3/2022.  Dose was increased at that time.  She is not able to tolerate levothyroxine secondary to side effects of acne.  Not planning pregnancy.  Plan:  Discussed diagnosis, pathophysiology, management and treatment options of condition with pt.  Labs needed.  Plan to repeat thyroid labs and screen for Hashimoto.  Dose adjustment based on that.  Continue NP thyroid at current dose for now.  She prefers to stay on NP thyroid given intolerance to levothyroxine as noted above.  I discussed with patient that as long as thyroid labs are in normal range her symptoms are less likely thyroid related.  Fatigue can be multifactorial and if thyroid labs are in acceptable range then recommend follow-up with primary care provider for further work-up.  Plan: T4 free, T3 Free, TSH, Thyroid peroxidase         antibody         American Association of Clinical Endocrinologists does not recommend the use of desiccated thyroid for thyroid replacement therapy for hypothyroidism (Tejinder, 2002).  American Association of Clinical Endocrinologists does not recommend the use of desiccated thyroid for thyroid replacement therapy for hypothyroidism ([place], 2002).  I also discussed that American thyroid Association typically do not recommend use of [place] Thyroid/nature thyroid compared to levothyroxine/Synthroid.  Sometimes free T4 level tend to decrease slightly on [place] Thyroid as there is hyperaeration of T3 compared to T4 normal thyroid.    Discussed normal physiology of thyroid hormone release from the  thyroid in a T4 : T3 ratio of 14:1. [place]/Nature thyroid has a ratio of T4 : T3 of 4:1, so overall more T3. This often skews FT4 levels and they often become slightly low in patients on Nature/[place].      All questions were answered.  The patient indicates understanding of the above issues and agrees with the plan set forth.      Follow-up:  Based on labs.    Zayra Regalado MD  Endocrinology  [facility]  CC: Clinic, [facility]      All questions were answered.  The patient indicates understanding of the above issues and agrees with the plan set forth.           Again, thank you for allowing me to participate in the care of your patient.        Sincerely,        Zayra Regalado MD

## 2022-06-21 NOTE — PROGRESS NOTES
"THIS IS A VIDEO VISIT:    Phone call visit/virtual visit encounter:    Name of patient: Wendi Cerda    Date of encounter: 6/21/2022    Time of start of video visit: 3;30    Video started:3:40    Video ended:3:54    Provider location: working from home/ [facility]    Patient location: patients home.    Mode of transmission: video/ Doximity    Verbal consent: obtained before starting visit. Pt is agreeable.      The patient has been notified of following:      \"This VIDEO visit will be conducted via a call between you and your physician/provider. We have found that certain health care needs can be provided without the need for a physical exam.  This service lets us provide the care you need with a short phone conversation.  If a prescription is necessary we can send it directly to your pharmacy.  If lab work is needed we can place an order for that and you can then stop by our lab to have the test done at a later time.     With new updates with corona virus patient might be billed as clinic visit.     If during the course of the call the physician/provider feels a telephone visit is not appropriate, you will not be charged for this service.\"      Past medical history, social history, family history, allergy and medications were reviewed and updated as appropriate.  Reviewed pertinent labs, notes, imaging studies personally.    ENDOCRINOLOGY CLINIC NOTE:    Name: Wendi Cerda  Seen in consultation with Luna CAICEDO ([facility]) for Hypothyroidism.  HPI:  Wendi Cerda is a 32 year old female who presents for the evaluation of above.   has a past medical history of Back pain, Gastro-oesophageal reflux disease, and Scoliosis.   PCP is outside [place].  Available records, labs and images from outside clinic were personally reviewed.    #1 Hypothyroidism.  Initially diagnosed in hers early 20s.  She was started on levothyroxine but was not able to tolerate that. (she had acne on it which resolved " after switching to [place])    Was switched to [place] X 7 years    Currently taking NP thyroid 120 mg/day.  On this dose X 3/2022.    In last year she has gained weight and not able to loose it.  Feeling tired all the time.    Palpitations: Sometimes  Changes to hair or skin: + hair loss  Diarrhea/Constipation:sometimes constipation  Changes in menses: on OC pills. Has 2 kids. No plans for future pregnancy at this time.  Changes in vision:sometiems blurry  Dysphagia or Shortness of breath:No  Tremors: sometimes  Difficulty sleeping: hard to go to sleep and stay asleep. Has unremarkable sleep study done in past by pt report.  Changes in weight: as noted above  Heat or cold intolerance: + cold intolerance.  History of Lithium or Amiodarone use:No  Head or neck surgery/radiation:No  Family History of Thyroid Problems: No  PMH/PSH:  Past Medical History:   Diagnosis Date     Back pain      Gastro-oesophageal reflux disease      Scoliosis      Past Surgical History:   Procedure Laterality Date     TONSILLECTOMY       Family Hx:  Thyroid disease: No           Social Hx:  Social History     Socioeconomic History     Marital status: Single     Spouse name: Not on file     Number of children: Not on file     Years of education: Not on file     Highest education level: Not on file   Occupational History     Not on file   Tobacco Use     Smoking status: Former Smoker     Smokeless tobacco: Never Used   Substance and Sexual Activity     Alcohol use: Yes     Drug use: No     Comment: e cig     Sexual activity: Not on file   Other Topics Concern     Not on file   Social History Narrative     Not on file     Social Determinants of Health     Financial Resource Strain: Not on file   Food Insecurity: Not on file   Transportation Needs: Not on file   Physical Activity: Not on file   Stress: Not on file   Social Connections: Not on file   Intimate Partner Violence: Not on file   Housing Stability: Not on file          MEDICATIONS:  has  a current medication list which includes the following prescription(s): acetaminophen, amphetamine-dextroamphetamine, ibuprofen, norethindrone-ethinyl estradiol-iron, thyroid, magic mouthwash suspension (diphenhydramine, lidocaine, aluminum-magnesium & simethicone), and levothyroxine sodium.    ROS   ROS: 10 point ROS neg other than the symptoms noted above in the HPI.    Physical Exam   VS: There were no vitals taken for this visit.  GENERAL: healthy, alert and no distress  EYES: Eyes grossly normal to inspection, conjunctivae and sclerae normal  ENT: no nose swelling, nasal discharge.  Thyroid: no apparent thyroid nodules  RESP: no audible wheeze, cough, or visible cyanosis.  No visible retractions or increased work of breathing.  Able to speak fully in complete sentences.  ABDO: not evaluated.  EXTREMITIES: no hand tremors.  NEURO: Cranial nerves grossly intact, mentation intact and speech normal  SKIN: No apparent skin lesions, rash or edema seen   PSYCH: mentation appears normal, affect normal/bright, judgement and insight intact, normal speech and appearance well-groomed    LABS:  TFTs:  ENDO THYROID LABS-P Latest Ref Rng & Units 2/23/2022   TSH (EXTERNAL) 0.270 - 4.200 uIU/ml 13.200 (A)     TG/TPO:    All pertinent notes, labs, and images personally reviewed by me.     A/P  Ms.Jessica ELVIS Cerda is a 32 year old here for the evaluation of hypothyroidism:    #1 Hypothyroidism. Differential includes: autoimmune disease (Hashimoto's thyroiditis), treatment for hyperthyroidism, radiation therapy, thyroid surgery, medications, congenital disease, pituitary disorder, pregnancy, and iodine deficiency.  Persons with Hashimoto's thyroiditis have serum antibodies reacting with TG, TPO, and against an unidentified protein present in colloid.   Currently taking NP thyroid 120 mg/day and on this dose since 3/2022.  Dose was increased at that time.  She is not able to tolerate levothyroxine secondary to side effects of  acne.  Not planning pregnancy.  Plan:  Discussed diagnosis, pathophysiology, management and treatment options of condition with pt.  Labs needed.  Plan to repeat thyroid labs and screen for Hashimoto.  Dose adjustment based on that.  Continue NP thyroid at current dose for now.  She prefers to stay on NP thyroid given intolerance to levothyroxine as noted above.  I discussed with patient that as long as thyroid labs are in normal range her symptoms are less likely thyroid related.  Fatigue can be multifactorial and if thyroid labs are in acceptable range then recommend follow-up with primary care provider for further work-up.  Plan: T4 free, T3 Free, TSH, Thyroid peroxidase         antibody         American Association of Clinical Endocrinologists does not recommend the use of desiccated thyroid for thyroid replacement therapy for hypothyroidism (Tejinder, 2002).  American Association of Clinical Endocrinologists does not recommend the use of desiccated thyroid for thyroid replacement therapy for hypothyroidism (Tejinder, 2002).  I also discussed that American thyroid Association typically do not recommend use of [place] Thyroid/nature thyroid compared to levothyroxine/Synthroid.  Sometimes free T4 level tend to decrease slightly on [place] Thyroid as there is hyperaeration of T3 compared to T4 normal thyroid.    Discussed normal physiology of thyroid hormone release from the thyroid in a T4 : T3 ratio of 14:1. [place]/Nature thyroid has a ratio of T4 : T3 of 4:1, so overall more T3. This often skews FT4 levels and they often become slightly low in patients on [place].      All questions were answered.  The patient indicates understanding of the above issues and agrees with the plan set forth.      Follow-up:  Based on labs.    Zayra Regalado MD  Endocrinology  [facility]  CC: Clinic, [facility]      All questions were answered.  The patient indicates understanding of the above  issues and agrees with the plan set forth.

## 2022-06-21 NOTE — NURSING NOTE
Patient is prescribed 2 tablets of 30 mg of Adderall daily, but some days she will only take one tablet instead of 2. Pharmacy updated in chart. Patient reports that she takes NP thyroid instead of levothyroxine. Medication abstracted from Medication Reconsolidation per patient.    Rabia CALABRESE, Virtual Visit Facilitator 3:23 PM June 21, 2022

## 2024-01-20 ENCOUNTER — HOSPITAL ENCOUNTER (EMERGENCY)
Facility: CLINIC | Age: 34
Discharge: HOME | End: 2024-01-20
Attending: EMERGENCY MEDICINE | Admitting: EMERGENCY MEDICINE
Payer: COMMERCIAL

## 2024-01-20 ENCOUNTER — APPOINTMENT (OUTPATIENT)
Dept: CT IMAGING | Facility: CLINIC | Age: 34
End: 2024-01-20
Attending: EMERGENCY MEDICINE
Payer: COMMERCIAL

## 2024-01-20 VITALS
WEIGHT: 198 LBS | SYSTOLIC BLOOD PRESSURE: 110 MMHG | TEMPERATURE: 96.8 F | HEIGHT: 63 IN | RESPIRATION RATE: 18 BRPM | HEART RATE: 67 BPM | OXYGEN SATURATION: 99 % | DIASTOLIC BLOOD PRESSURE: 67 MMHG | BODY MASS INDEX: 35.08 KG/M2

## 2024-01-20 DIAGNOSIS — R00.0 TACHYCARDIA: ICD-10-CM

## 2024-01-20 DIAGNOSIS — R00.2 PALPITATIONS: ICD-10-CM

## 2024-01-20 LAB
ALBUMIN SERPL BCG-MCNC: 3.8 G/DL (ref 3.5–5.2)
ALP SERPL-CCNC: 46 U/L (ref 40–150)
ALT SERPL W P-5'-P-CCNC: 17 U/L (ref 0–50)
ANION GAP SERPL CALCULATED.3IONS-SCNC: 9 MMOL/L (ref 7–15)
AST SERPL W P-5'-P-CCNC: 14 U/L (ref 0–45)
BASOPHILS # BLD AUTO: 0 10E3/UL (ref 0–0.2)
BASOPHILS NFR BLD AUTO: 0 %
BILIRUB DIRECT SERPL-MCNC: <0.2 MG/DL (ref 0–0.3)
BILIRUB SERPL-MCNC: <0.2 MG/DL
BUN SERPL-MCNC: 11.1 MG/DL (ref 6–20)
CALCIUM SERPL-MCNC: 9 MG/DL (ref 8.6–10)
CHLORIDE SERPL-SCNC: 103 MMOL/L (ref 98–107)
CREAT SERPL-MCNC: 0.68 MG/DL (ref 0.51–0.95)
DEPRECATED HCO3 PLAS-SCNC: 25 MMOL/L (ref 22–29)
EGFRCR SERPLBLD CKD-EPI 2021: >90 ML/MIN/1.73M2
EOSINOPHIL # BLD AUTO: 0.4 10E3/UL (ref 0–0.7)
EOSINOPHIL NFR BLD AUTO: 4 %
ERYTHROCYTE [DISTWIDTH] IN BLOOD BY AUTOMATED COUNT: 13.6 % (ref 10–15)
GLUCOSE SERPL-MCNC: 119 MG/DL (ref 70–99)
HCG SERPL QL: NEGATIVE
HCT VFR BLD AUTO: 39.7 % (ref 35–47)
HGB BLD-MCNC: 13 G/DL (ref 11.7–15.7)
HOLD SPECIMEN: NORMAL
HOLD SPECIMEN: NORMAL
IMM GRANULOCYTES # BLD: 0 10E3/UL
IMM GRANULOCYTES NFR BLD: 0 %
LYMPHOCYTES # BLD AUTO: 3.4 10E3/UL (ref 0.8–5.3)
LYMPHOCYTES NFR BLD AUTO: 41 %
MAGNESIUM SERPL-MCNC: 2 MG/DL (ref 1.7–2.3)
MCH RBC QN AUTO: 27.4 PG (ref 26.5–33)
MCHC RBC AUTO-ENTMCNC: 32.7 G/DL (ref 31.5–36.5)
MCV RBC AUTO: 84 FL (ref 78–100)
MONOCYTES # BLD AUTO: 0.6 10E3/UL (ref 0–1.3)
MONOCYTES NFR BLD AUTO: 8 %
NEUTROPHILS # BLD AUTO: 3.9 10E3/UL (ref 1.6–8.3)
NEUTROPHILS NFR BLD AUTO: 47 %
NRBC # BLD AUTO: 0 10E3/UL
NRBC BLD AUTO-RTO: 0 /100
PLATELET # BLD AUTO: 393 10E3/UL (ref 150–450)
POTASSIUM SERPL-SCNC: 4.1 MMOL/L (ref 3.4–5.3)
PROT SERPL-MCNC: 6.6 G/DL (ref 6.4–8.3)
RBC # BLD AUTO: 4.75 10E6/UL (ref 3.8–5.2)
SODIUM SERPL-SCNC: 137 MMOL/L (ref 135–145)
TROPONIN T SERPL HS-MCNC: <6 NG/L
TSH SERPL DL<=0.005 MIU/L-ACNC: 0.54 UIU/ML (ref 0.3–4.2)
WBC # BLD AUTO: 8.3 10E3/UL (ref 4–11)

## 2024-01-20 PROCEDURE — 96360 HYDRATION IV INFUSION INIT: CPT | Mod: 59

## 2024-01-20 PROCEDURE — 84703 CHORIONIC GONADOTROPIN ASSAY: CPT | Performed by: EMERGENCY MEDICINE

## 2024-01-20 PROCEDURE — 83735 ASSAY OF MAGNESIUM: CPT | Performed by: EMERGENCY MEDICINE

## 2024-01-20 PROCEDURE — 250N000009 HC RX 250: Performed by: EMERGENCY MEDICINE

## 2024-01-20 PROCEDURE — 71260 CT THORAX DX C+: CPT

## 2024-01-20 PROCEDURE — 99285 EMERGENCY DEPT VISIT HI MDM: CPT | Mod: 25

## 2024-01-20 PROCEDURE — 80053 COMPREHEN METABOLIC PANEL: CPT | Performed by: EMERGENCY MEDICINE

## 2024-01-20 PROCEDURE — 93005 ELECTROCARDIOGRAM TRACING: CPT

## 2024-01-20 PROCEDURE — 84443 ASSAY THYROID STIM HORMONE: CPT | Performed by: EMERGENCY MEDICINE

## 2024-01-20 PROCEDURE — 36415 COLL VENOUS BLD VENIPUNCTURE: CPT | Performed by: EMERGENCY MEDICINE

## 2024-01-20 PROCEDURE — 84484 ASSAY OF TROPONIN QUANT: CPT | Performed by: EMERGENCY MEDICINE

## 2024-01-20 PROCEDURE — 258N000003 HC RX IP 258 OP 636: Performed by: EMERGENCY MEDICINE

## 2024-01-20 PROCEDURE — 250N000011 HC RX IP 250 OP 636: Performed by: EMERGENCY MEDICINE

## 2024-01-20 PROCEDURE — 85004 AUTOMATED DIFF WBC COUNT: CPT | Performed by: EMERGENCY MEDICINE

## 2024-01-20 RX ORDER — IOPAMIDOL 755 MG/ML
500 INJECTION, SOLUTION INTRAVASCULAR ONCE
Status: COMPLETED | OUTPATIENT
Start: 2024-01-20 | End: 2024-01-20

## 2024-01-20 RX ORDER — LEVOTHYROXINE SODIUM 175 UG/1
175 TABLET ORAL DAILY
COMMUNITY

## 2024-01-20 RX ADMIN — SODIUM CHLORIDE 80 ML: 9 INJECTION, SOLUTION INTRAVENOUS at 22:07

## 2024-01-20 RX ADMIN — IOPAMIDOL 72 ML: 755 INJECTION, SOLUTION INTRAVENOUS at 22:07

## 2024-01-20 RX ADMIN — SODIUM CHLORIDE 1000 ML: 9 INJECTION, SOLUTION INTRAVENOUS at 21:57

## 2024-01-20 ASSESSMENT — ACTIVITIES OF DAILY LIVING (ADL): ADLS_ACUITY_SCORE: 35

## 2024-01-21 ENCOUNTER — ORDERS ONLY (AUTO-RELEASED) (OUTPATIENT)
Dept: EMERGENCY MEDICINE | Facility: CLINIC | Age: 34
End: 2024-01-21
Payer: COMMERCIAL

## 2024-01-21 DIAGNOSIS — R00.2 PALPITATIONS: ICD-10-CM

## 2024-01-21 PROCEDURE — 93248 EXT ECG>7D<15D REV&INTERPJ: CPT | Performed by: INTERNAL MEDICINE

## 2024-01-21 NOTE — ED TRIAGE NOTES
"Last month, pt will wake up with \"racing heart\".   Took ambien prior to deciding to come in, in hopes to sleep it off.         "

## 2024-01-21 NOTE — ED PROVIDER NOTES
"  History     Chief Complaint:  Palpitations       The history is provided by the patient.      Wendi Cerda is a 33 year old female who presents for evaluation of heart palpitations. The patient reports that for the past month while laying down her heart has been racing and feels like \"someone was grabbing my heart\". She adds that she is getting light headed and feels like she \"dropping down on a roller coaster but never hitting the floor\". She notes that she feels this way when laying down at night or waking up in the morning. Currently, she took an Ambien to get some rest and her chest feels heavy. She adds that she can't feel her legs from the knee down. She knows that they're there and can move them, but they feel cold.  She has been working out everyday, but notes no abnormal rhythms while exercising. She doesn't believe that these episodes of palpitations are caused by panic attacks as she notes that she doesn't feel panic until after the palpitations have started. She has cut out all caffeine a while ago and is now cutting out processed foods. She had a similar instance in 2022 and got an CT scan done which showed an enlarged aorta. She denies any recent travel or medication changes.     Independent Historian:   Family present and provides additional history.    Review of External Notes:   Outpatient visit reviewed from November 9, 2022 and the patient was seen for palpitations and had an echocardiogram.  Echocardiogram did not show any significant valvular abnormality or evidence of cardiomyopathy.       Medications:    Acetaminophen   Amphetamine-dextroamphetamine  DPH-Lido-AlHydr-MgHydr-Simeth  Ibuprofen  Norethindrone-ethinyl estradiol-iron  Thyroid  Levothyroxine  Ondansetron  [place] thyroid   Cyclobenzaprine  Albuterol  Azithromycin  Hydroxyzine  Benzonatate    Past Medical History:    Back pain   Gastro-esophageal reflux disease   Scoliosis  Obstructive sleep apnea  Hypothyroidism  ADHD    Past " "Surgical History:    Tonsillectomy     Physical Exam   Patient Vitals for the past 24 hrs:   BP Temp Temp src Pulse Resp SpO2 Height Weight   01/20/24 2320 110/67 -- -- 67 18 99 % -- --   01/20/24 2319 -- -- -- 72 14 99 % -- --   01/20/24 2318 -- -- -- 69 12 99 % -- --   01/20/24 2317 -- -- -- 71 11 100 % -- --   01/20/24 2316 -- -- -- 68 (!) 9 100 % -- --   01/20/24 2315 -- -- -- 64 15 98 % -- --   01/20/24 2300 102/64 -- -- 64 14 98 % -- --   01/20/24 2240 105/70 -- -- 71 20 97 % -- --   01/20/24 2215 112/56 -- -- 86 -- 100 % -- --   01/20/24 2143 -- -- -- -- -- -- 1.594 m (5' 2.75\") 89.8 kg (198 lb)   01/20/24 2141 113/77 -- -- 72 18 100 % -- --   01/20/24 2119 (!) 129/94 96.8  F (36  C) Temporal 72 18 98 % -- --        Physical Exam  Constitutional:       General: She is not in acute distress.     Appearance: Normal appearance. She is not diaphoretic.   HENT:      Head: Atraumatic.      Mouth/Throat:      Mouth: Mucous membranes are moist.   Eyes:      General: No scleral icterus.     Conjunctiva/sclera: Conjunctivae normal.   Neck:      Comments: No mass or thyromegaly  Cardiovascular:      Rate and Rhythm: Normal rate and regular rhythm.      Heart sounds: Normal heart sounds.   Pulmonary:      Effort: No respiratory distress.      Breath sounds: Normal breath sounds.   Abdominal:      General: Abdomen is flat. There is no distension.      Tenderness: There is no abdominal tenderness.   Musculoskeletal:      Cervical back: Neck supple.   Skin:     General: Skin is warm.      Capillary Refill: Capillary refill takes less than 2 seconds.      Findings: No rash.   Neurological:      General: No focal deficit present.      Mental Status: She is alert and oriented to person, place, and time.   Psychiatric:         Mood and Affect: Mood normal.         Behavior: Behavior normal.           Emergency Department Course   ECG  ECG results from 01/20/24   EKG 12-lead, tracing only     Value    Systolic Blood Pressure     " Diastolic Blood Pressure     Ventricular Rate 75    Atrial Rate 75    NV Interval 128    QRS Duration 70        QTc 428    P Axis 39    R AXIS 34    T Axis 27    Interpretation ECG      Sinus rhythm with sinus arrhythmia  Normal ECG  When compared with ECG of 12-MAR-2018 23:44,  No significant change was found        Imaging:  CT Aortic Survey w Contrast   Final Result   IMPRESSION:   1.  Negative for aortic dissection. No proximal pulmonary embolism.      2.  No evidence of pneumonia.      3.  No focal inflammatory change in the abdomen or pelvis.                Laboratory:  Labs Ordered and Resulted from Time of ED Arrival to Time of ED Departure   BASIC METABOLIC PANEL - Abnormal       Result Value    Sodium 137      Potassium 4.1      Chloride 103      Carbon Dioxide (CO2) 25      Anion Gap 9      Urea Nitrogen 11.1      Creatinine 0.68      GFR Estimate >90      Calcium 9.0      Glucose 119 (*)    TROPONIN T, HIGH SENSITIVITY - Normal    Troponin T, High Sensitivity <6     MAGNESIUM - Normal    Magnesium 2.0     TSH WITH FREE T4 REFLEX - Normal    TSH 0.54     HCG QUALITATIVE PREGNANCY - Normal    hCG Serum Qualitative Negative     HEPATIC FUNCTION PANEL - Normal    Protein Total 6.6      Albumin 3.8      Bilirubin Total <0.2      Alkaline Phosphatase 46      AST 14      ALT 17      Bilirubin Direct <0.20     CBC WITH PLATELETS AND DIFFERENTIAL    WBC Count 8.3      RBC Count 4.75      Hemoglobin 13.0      Hematocrit 39.7      MCV 84      MCH 27.4      MCHC 32.7      RDW 13.6      Platelet Count 393      % Neutrophils 47      % Lymphocytes 41      % Monocytes 8      % Eosinophils 4      % Basophils 0      % Immature Granulocytes 0      NRBCs per 100 WBC 0      Absolute Neutrophils 3.9      Absolute Lymphocytes 3.4      Absolute Monocytes 0.6      Absolute Eosinophils 0.4      Absolute Basophils 0.0      Absolute Immature Granulocytes 0.0      Absolute NRBCs 0.0          Emergency Department Course &  Assessments:             Interventions:  Medications   sodium chloride 0.9% BOLUS 1,000 mL (0 mLs Intravenous Stopped 1/20/24 2316)   Sodium Chloride for CT Scan Flush Use (80 mLs Intravenous $Given 1/20/24 2207)   iopamidol (ISOVUE-370) solution 500 mL (72 mLs Intravenous $Given 1/20/24 2207)        Assessments/Consultations/Discussion of Management or Tests:  ED Course as of 01/21/24 0038   Sat Jan 20, 2024   2142 Initial evaluation       Disposition:  The patient was discharged to home.     Impression & Plan      Medical Decision Making:  This patient is a 33-year-old who presents to the emergency department for evaluation of episodes of palpitations.  This has been going on for 2 years or more.  She did previously have an echocardiogram that was normal except for questionably some enlargement of the proximal aorta.  In the last month the patient has been having increased frequency of her symptoms.  She is try to cut down on caffeine.  She is exercising more and tries to stay hydrated.    The patient is hemodynamically stable and symptom-free here.  Electrolytes look good.  She does have a history of hypothyroidism but TSH is normal.  Given some chest discomfort at times and the questionable enlargement of the aorta on the prior echocardiogram she did have a CT scan.  Fortunately aorta is normal is no dissection or other acute abnormality.    The patient remains pain-free.  I will order a Zio patch as she has not had a completion of her formal workup.  Referring her to cardiology given the persistence of her symptoms as well.      Diagnosis:    ICD-10-CM    1. Palpitations  R00.2 Follow-Up with Cardiology     Zio Patch Mail Out     Primary Care Referral      2. Tachycardia  R00.0              Scribe Disclosure:  I, Steven Randall, am serving as a scribe at 10:06 PM on 1/20/2024 to document services personally performed by Neo Javier, based on my observations and the provider's statements to me.      I, Stefania Ramos, am serving as a scribe at 10:19 PM on 1/20/2024 to document services personally performed by Neo Javier MD based on my observations and the provider's statements to me.      1/20/2024   Neo Javier MD McRoberts, Sean Edward, MD  01/21/24 0039

## 2024-01-22 LAB
ATRIAL RATE - MUSE: 75 BPM
DIASTOLIC BLOOD PRESSURE - MUSE: NORMAL MMHG
INTERPRETATION ECG - MUSE: NORMAL
P AXIS - MUSE: 39 DEGREES
PR INTERVAL - MUSE: 128 MS
QRS DURATION - MUSE: 70 MS
QT - MUSE: 384 MS
QTC - MUSE: 428 MS
R AXIS - MUSE: 34 DEGREES
SYSTOLIC BLOOD PRESSURE - MUSE: NORMAL MMHG
T AXIS - MUSE: 27 DEGREES
VENTRICULAR RATE- MUSE: 75 BPM

## 2024-01-26 ENCOUNTER — ORDERS ONLY (AUTO-RELEASED) (OUTPATIENT)
Dept: EMERGENCY MEDICINE | Facility: CLINIC | Age: 34
End: 2024-01-26
Payer: COMMERCIAL

## 2024-01-26 DIAGNOSIS — R00.0 TACHYCARDIA: ICD-10-CM

## 2024-01-27 NOTE — ED PROVIDER NOTES
Patient was seen here by Dr. Javier on January 20 and had a Zio patch mail out ordered.  Patient calls ED tonight as the 1 that was sent to her was defective.  She was told that we needed to order a second 1.  I have placed this order for her.     Waqar Lora MD  01/26/24 2046

## 2024-03-05 ENCOUNTER — OFFICE VISIT (OUTPATIENT)
Dept: CARDIOLOGY | Facility: CLINIC | Age: 34
End: 2024-03-05
Attending: EMERGENCY MEDICINE
Payer: COMMERCIAL

## 2024-03-05 VITALS
DIASTOLIC BLOOD PRESSURE: 74 MMHG | WEIGHT: 192.7 LBS | OXYGEN SATURATION: 95 % | HEIGHT: 63 IN | BODY MASS INDEX: 34.14 KG/M2 | SYSTOLIC BLOOD PRESSURE: 114 MMHG | HEART RATE: 92 BPM

## 2024-03-05 DIAGNOSIS — E66.811 CLASS 1 OBESITY DUE TO EXCESS CALORIES WITHOUT SERIOUS COMORBIDITY WITH BODY MASS INDEX (BMI) OF 34.0 TO 34.9 IN ADULT: ICD-10-CM

## 2024-03-05 DIAGNOSIS — E66.09 CLASS 1 OBESITY DUE TO EXCESS CALORIES WITHOUT SERIOUS COMORBIDITY WITH BODY MASS INDEX (BMI) OF 34.0 TO 34.9 IN ADULT: ICD-10-CM

## 2024-03-05 DIAGNOSIS — R00.2 PALPITATIONS: Primary | ICD-10-CM

## 2024-03-05 DIAGNOSIS — E03.9 HYPOTHYROIDISM, UNSPECIFIED TYPE: ICD-10-CM

## 2024-03-05 PROCEDURE — 99204 OFFICE O/P NEW MOD 45 MIN: CPT | Performed by: INTERNAL MEDICINE

## 2024-03-05 NOTE — PROGRESS NOTES
CARDIOLOGY CLINIC CONSULTATION      REASON FOR CONSULT:   Palpitations    PRIMARY CARE PHYSICIAN:  Provider Not In System        History of Present Illness   Wendi Cerda is an extremely pleasant 34 year old female here for evaluation of palpitations.  Her medical history is significant for hypothyroidism, chronic fatigue, ADHD, obesity, and mild SKYLER.  No family history of heart problems that she is aware of.  She previously smoked cigarettes and vaped, but stopped over a year ago.  She rarely drinks alcohol.  She recently has gotten back into exercise.    She reports that she has always had issues with low energy, but more recently the fatigue has been extreme and she has been trying to figure out why.  She has seen a sleep specialist, is going to see a hormone therapist, etc.  The fatigue is better when she would take Adderall, but this would cause palpitation issues and she did not like how she felt with this, so she has not been taking this for the past few months.  In regards to the palpitations, she notes that these can occur at any time without any obvious trigger that she can identify.  She describes this as a feeling of going down a roller coaster and never hitting the bottom.  This is not associated with syncope but she has a sensation like her body is pulling apart from the inside.      She has had significant evaluation for this, including an echocardiogram in November 2022 which was entirely normal, a CT aortic survey in January 2024 which was normal, and a Zio patch in February 2024.  I personally reviewed the Zio patch with her today, and she tells me that she did not have any severe episodes while wearing the Zio patch, but did have 9 triggers for more minor symptoms.  She has roughly 12 days worth of data, with only a single 4 beat run of SVT, and less than 1% PACs and PVCs.  All of the 9 triggers were associated with sinus rhythm.  She also had lab testing from January 20, 2024, which showed  normal electrolytes, normal renal function, normal transaminases, normal TSH, undetectable high-sensitivity troponin, and a normal CBC.      Assessment & Plan     Palpitations, uncertain etiology, with no significant dysrhythmia on 2/2024 Zio patch  Structurally normal heart on 11/2022 TTE  Chronic fatigue, uncertain etiology  Hypothyroidism  ADHD  Obesity  Mild SKYLER  Former tobacco abuse      It was a pleasure to meet with Wendi in clinic today.  We discussed the potential causes of her palpitations and fatigue.  Briefly, I explained that the fatigue certainly could have many different etiologies, but is unlikely to be related to her heart.  I would encourage her to continue her excellent recent lifestyle changes, and hopefully this will lead to symptomatic improvement in the weeks and months to come, and perhaps it may benefit her to speak with a sleep specialist as well.  In regards to the palpitations, thankfully all the testing that we have seen to this point has been reassuring, including a structurally normal heart on echo and no significant dysrhythmia on Zio patch.  Of course, it is possible that she could have rare significant dysrhythmia which was not captured on the Zio patch.  In that case, we talked about options for commercial monitoring devices, and she tells me that she actually already has a [employer] device.  I think that this is a great option, and asked her to please send us any recordings that she takes of future episodes if she is concerned about them, and we are happy to review them.  Otherwise, I think it is appropriate for her to continue working on gradually building up her aerobic capacity through regular exercise, and would encourage her to do so.      Follow-up: No routine cardiology follow-up is required at this time, though we would be happy to see Wendi back at anytime with future questions or concerns.      On the date of the patient's visit, I spent a total of 48 minutes reviewing  the patient's chart; interviewing, examining, and counseling the patient; coordinating with other providers as necessary, entering orders, and documenting in the medical chart.      Luis Armando Holbrook MD  Interventional Cardiology  March 5, 2024        Medications   Current Outpatient Medications   Medication    acetaminophen (TYLENOL) 325 MG tablet    Amphetamine-Dextroamphetamine (ADDERALL PO)    ibuprofen (ADVIL,MOTRIN) 200 MG tablet    levothyroxine (SYNTHROID/LEVOTHROID) 175 MCG tablet    norethindrone-ethinyl estradiol-iron (ESTROSTEP FE) 1-20/1-30/1-35 MG-MCG per tablet    thyroid (ARMOUR) 120 MG tablet     No current facility-administered medications for this visit.     Allergies   Allergies   Allergen Reactions    Contrast Dye Nausea and Vomiting         Physical Exam       BP: 114/74 Pulse: 92     SpO2: 95 %      Vital Signs with Ranges  Pulse:  [92] 92  BP: (114)/(74) 114/74  SpO2:  [95 %] 95 %  192 lbs 11.2 oz    Constitutional: Well-appearing, no acute distress  Respiratory: Normal respiratory effort, CTAB  Cardiovascular: RRR, no m/r/g.  JVP < 7 cm H2O.  There is no LE edema.  Normal carotid upstrokes, no carotid bruits.

## 2024-03-05 NOTE — LETTER
3/5/2024    Provider Not In System       RE: eWndi Cerda       Dear Colleague,     [facility] the [facility]  CARDIOLOGY CLINIC CONSULTATION      REASON FOR CONSULT:   Palpitations    PRIMARY CARE PHYSICIAN:  Provider Not In System        History of Present Illness  Wendi Cerda is an extremely pleasant 34 year old female here for evaluation of palpitations.  Her medical history is significant for hypothyroidism, chronic fatigue, ADHD, obesity, and mild SKYLER.  No family history of heart problems that she is aware of.  She previously smoked cigarettes and vaped, but stopped over a year ago.  She rarely drinks alcohol.  She recently has gotten back into exercise.    She reports that she has always had issues with low energy, but more recently the fatigue has been extreme and she has been trying to figure out why.  She has seen a sleep specialist, is going to see a hormone therapist, etc.  The fatigue is better when she would take Adderall, but this would cause palpitation issues and she did not like how she felt with this, so she has not been taking this for the past few months.  In regards to the palpitations, she notes that these can occur at any time without any obvious trigger that she can identify.  She describes this as a feeling of going down a roller coaster and never hitting the bottom.  This is not associated with syncope but she has a sensation like her body is pulling apart from the inside.      She has had significant evaluation for this, including an echocardiogram in November 2022 which was entirely normal, a CT aortic survey in January 2024 which was normal, and a Zio patch in February 2024.  I personally reviewed the Zio patch with her today, and she tells me that she did not have any severe episodes while wearing the Zio patch, but did have 9 triggers for more minor symptoms.  She has roughly 12 days worth of data, with only a single 4  beat run of SVT, and less than 1% PACs and PVCs.  All of the 9 triggers were associated with sinus rhythm.  She also had lab testing from January 20, 2024, which showed normal electrolytes, normal renal function, normal transaminases, normal TSH, undetectable high-sensitivity troponin, and a normal CBC.      Assessment & Plan    Palpitations, uncertain etiology, with no significant dysrhythmia on 2/2024 Zio patch  Structurally normal heart on 11/2022 TTE  Chronic fatigue, uncertain etiology  Hypothyroidism  ADHD  Obesity  Mild SKYLER  Former tobacco abuse      It was a pleasure to meet with Wendi in clinic today.  We discussed the potential causes of her palpitations and fatigue.  Briefly, I explained that the fatigue certainly could have many different etiologies, but is unlikely to be related to her heart.  I would encourage her to continue her excellent recent lifestyle changes, and hopefully this will lead to symptomatic improvement in the weeks and months to come, and perhaps it may benefit her to speak with a sleep specialist as well.  In regards to the palpitations, thankfully all the testing that we have seen to this point has been reassuring, including a structurally normal heart on echo and no significant dysrhythmia on Zio patch.  Of course, it is possible that she could have rare significant dysrhythmia which was not captured on the Zio patch.  In that case, we talked about options for commercial monitoring devices, and she tells me that she actually already has a [employer] device.  I think that this is a great option, and asked her to please send us any recordings that she takes of future episodes if she is concerned about them, and we are happy to review them.  Otherwise, I think it is appropriate for her to continue working on gradually building up her aerobic capacity through regular exercise, and would encourage her to do so.      Follow-up: No routine cardiology follow-up is required at this time,  though we would be happy to see Wendi back at anytime with future questions or concerns.      On the date of the patient's visit, I spent a total of 48 minutes reviewing the patient's chart; interviewing, examining, and counseling the patient; coordinating with other providers as necessary, entering orders, and documenting in the medical chart.      Luis Armando Holbrook MD  Interventional Cardiology  March 5, 2024        Medications  Current Outpatient Medications   Medication    acetaminophen (TYLENOL) 325 MG tablet    Amphetamine-Dextroamphetamine (ADDERALL PO)    ibuprofen (ADVIL,MOTRIN) 200 MG tablet    levothyroxine (SYNTHROID/LEVOTHROID) 175 MCG tablet    norethindrone-ethinyl estradiol-iron (ESTROSTEP FE) 1-20/1-30/1-35 MG-MCG per tablet    thyroid (ARMOUR) 120 MG tablet     No current facility-administered medications for this visit.     Allergies  Allergies   Allergen Reactions    Contrast Dye Nausea and Vomiting         Physical Exam      BP: 114/74 Pulse: 92     SpO2: 95 %      Vital Signs with Ranges  Pulse:  [92] 92  BP: (114)/(74) 114/74  SpO2:  [95 %] 95 %  192 lbs 11.2 oz    Constitutional: Well-appearing, no acute distress  Respiratory: Normal respiratory effort, CTAB  Cardiovascular: RRR, no m/r/g.  JVP < 7 cm H2O.  There is no LE edema.  Normal carotid upstrokes, no carotid bruits.      Thank you for allowing me to participate in the care of your patient.      Sincerely,     Luis Armando Holbrook MD     [facility]  cc:   Neo Javier MD  [address] [place] [id number]